# Patient Record
Sex: FEMALE | Race: WHITE | NOT HISPANIC OR LATINO
[De-identification: names, ages, dates, MRNs, and addresses within clinical notes are randomized per-mention and may not be internally consistent; named-entity substitution may affect disease eponyms.]

---

## 2018-11-11 PROBLEM — Z00.00 ENCOUNTER FOR PREVENTIVE HEALTH EXAMINATION: Status: ACTIVE | Noted: 2018-11-11

## 2018-11-14 ENCOUNTER — RECORD ABSTRACTING (OUTPATIENT)
Age: 48
End: 2018-11-14

## 2018-11-14 DIAGNOSIS — Z87.09 PERSONAL HISTORY OF OTHER DISEASES OF THE RESPIRATORY SYSTEM: ICD-10-CM

## 2018-11-14 DIAGNOSIS — F17.200 NICOTINE DEPENDENCE, UNSPECIFIED, UNCOMPLICATED: ICD-10-CM

## 2018-11-14 DIAGNOSIS — Z87.19 PERSONAL HISTORY OF OTHER DISEASES OF THE DIGESTIVE SYSTEM: ICD-10-CM

## 2018-11-14 RX ORDER — NITROGLYCERIN 0.4 MG/1
0.4 TABLET SUBLINGUAL
Refills: 0 | Status: ACTIVE | COMMUNITY

## 2018-11-14 RX ORDER — METFORMIN HYDROCHLORIDE 500 MG/1
500 TABLET, COATED ORAL
Refills: 0 | Status: ACTIVE | COMMUNITY

## 2018-11-14 RX ORDER — IPRATROPIUM BROMIDE AND ALBUTEROL SULFATE 2.5; .5 MG/3ML; MG/3ML
0.5-2.5 (3) SOLUTION RESPIRATORY (INHALATION)
Refills: 0 | Status: ACTIVE | COMMUNITY

## 2018-11-14 RX ORDER — FLUTICASONE PROPIONATE AND SALMETEROL 50; 250 UG/1; UG/1
250-50 POWDER RESPIRATORY (INHALATION)
Refills: 0 | Status: ACTIVE | COMMUNITY

## 2018-11-14 RX ORDER — ALBUTEROL SULFATE 90 UG/1
108 (90 BASE) AEROSOL, METERED RESPIRATORY (INHALATION)
Refills: 0 | Status: ACTIVE | COMMUNITY

## 2018-12-06 ENCOUNTER — RX RENEWAL (OUTPATIENT)
Age: 48
End: 2018-12-06

## 2018-12-10 ENCOUNTER — APPOINTMENT (OUTPATIENT)
Dept: RHEUMATOLOGY | Facility: CLINIC | Age: 48
End: 2018-12-10
Payer: MEDICAID

## 2018-12-10 VITALS
DIASTOLIC BLOOD PRESSURE: 70 MMHG | WEIGHT: 293 LBS | BODY MASS INDEX: 45.99 KG/M2 | HEIGHT: 67 IN | SYSTOLIC BLOOD PRESSURE: 120 MMHG

## 2018-12-10 DIAGNOSIS — E66.01 MORBID (SEVERE) OBESITY DUE TO EXCESS CALORIES: ICD-10-CM

## 2018-12-10 PROCEDURE — 96372 THER/PROPH/DIAG INJ SC/IM: CPT

## 2018-12-10 PROCEDURE — 90471 IMMUNIZATION ADMIN: CPT

## 2018-12-10 PROCEDURE — 99213 OFFICE O/P EST LOW 20 MIN: CPT | Mod: 25

## 2018-12-10 PROCEDURE — 90686 IIV4 VACC NO PRSV 0.5 ML IM: CPT

## 2018-12-10 RX ORDER — KETOROLAC TROMETHAMINE 30 MG/ML
30 INJECTION, SOLUTION INTRAMUSCULAR; INTRAVENOUS
Qty: 1 | Refills: 0 | Status: COMPLETED | OUTPATIENT
Start: 2018-12-10

## 2018-12-10 RX ORDER — METHYLPRED ACET/NACL,ISO-OS/PF 40 MG/ML
40 VIAL (ML) INJECTION
Qty: 1 | Refills: 0 | Status: COMPLETED | OUTPATIENT
Start: 2018-12-10

## 2018-12-10 RX ADMIN — METHYLPREDNISOLONE ACETATE 1 MG/ML: 40 INJECTION, SUSPENSION INTRA-ARTICULAR; INTRALESIONAL; INTRAMUSCULAR; SOFT TISSUE at 00:00

## 2018-12-10 RX ADMIN — KETOROLAC TROMETHAMINE 1 MG/ML: 30 INJECTION, SOLUTION INTRAMUSCULAR; INTRAVENOUS at 00:00

## 2019-01-07 ENCOUNTER — RX RENEWAL (OUTPATIENT)
Age: 49
End: 2019-01-07

## 2019-01-11 ENCOUNTER — RX RENEWAL (OUTPATIENT)
Age: 49
End: 2019-01-11

## 2019-02-08 ENCOUNTER — RX RENEWAL (OUTPATIENT)
Age: 49
End: 2019-02-08

## 2019-02-11 ENCOUNTER — APPOINTMENT (OUTPATIENT)
Dept: RHEUMATOLOGY | Facility: CLINIC | Age: 49
End: 2019-02-11

## 2019-02-18 ENCOUNTER — RX RENEWAL (OUTPATIENT)
Age: 49
End: 2019-02-18

## 2019-03-08 ENCOUNTER — RX RENEWAL (OUTPATIENT)
Age: 49
End: 2019-03-08

## 2019-03-18 ENCOUNTER — RX RENEWAL (OUTPATIENT)
Age: 49
End: 2019-03-18

## 2019-03-23 PROBLEM — E66.01 MORBID OBESITY: Status: ACTIVE | Noted: 2018-11-14

## 2019-03-26 ENCOUNTER — RX RENEWAL (OUTPATIENT)
Age: 49
End: 2019-03-26

## 2019-04-05 ENCOUNTER — RX RENEWAL (OUTPATIENT)
Age: 49
End: 2019-04-05

## 2019-05-01 ENCOUNTER — RX RENEWAL (OUTPATIENT)
Age: 49
End: 2019-05-01

## 2019-05-06 ENCOUNTER — RX RENEWAL (OUTPATIENT)
Age: 49
End: 2019-05-06

## 2019-05-20 ENCOUNTER — RX RENEWAL (OUTPATIENT)
Age: 49
End: 2019-05-20

## 2019-06-03 ENCOUNTER — RX RENEWAL (OUTPATIENT)
Age: 49
End: 2019-06-03

## 2019-06-23 ENCOUNTER — RX RENEWAL (OUTPATIENT)
Age: 49
End: 2019-06-23

## 2019-07-02 ENCOUNTER — APPOINTMENT (OUTPATIENT)
Dept: RHEUMATOLOGY | Facility: CLINIC | Age: 49
End: 2019-07-02
Payer: MEDICAID

## 2019-07-02 VITALS
SYSTOLIC BLOOD PRESSURE: 122 MMHG | HEIGHT: 67 IN | WEIGHT: 293 LBS | DIASTOLIC BLOOD PRESSURE: 80 MMHG | BODY MASS INDEX: 45.99 KG/M2

## 2019-07-02 DIAGNOSIS — M25.50 PAIN IN UNSPECIFIED JOINT: ICD-10-CM

## 2019-07-02 PROCEDURE — 99214 OFFICE O/P EST MOD 30 MIN: CPT | Mod: 25

## 2019-07-02 PROCEDURE — 96372 THER/PROPH/DIAG INJ SC/IM: CPT | Mod: LT

## 2019-07-02 RX ORDER — METHYLPRED ACET/NACL,ISO-OS/PF 80 MG/ML
80 VIAL (ML) INJECTION
Qty: 1 | Refills: 0 | Status: COMPLETED | OUTPATIENT
Start: 2019-07-02

## 2019-07-02 RX ORDER — KETOROLAC TROMETHAMINE 30 MG/ML
30 INJECTION, SOLUTION INTRAMUSCULAR; INTRAVENOUS
Qty: 1 | Refills: 0 | Status: COMPLETED | OUTPATIENT
Start: 2019-07-02

## 2019-07-02 RX ADMIN — METHYLPREDNISOLONE ACETATE 0 MG/ML: 80 INJECTION, SUSPENSION INTRA-ARTICULAR; INTRALESIONAL; INTRAMUSCULAR; SOFT TISSUE at 00:00

## 2019-07-02 RX ADMIN — KETOROLAC TROMETHAMINE 2 MG/ML: 30 INJECTION, SOLUTION INTRAMUSCULAR; INTRAVENOUS at 00:00

## 2019-07-02 NOTE — HISTORY OF PRESENT ILLNESS
[FreeTextEntry1] : Last night she had severe fibromyalgia pain.  She is taking Lyrica pretty much regularly.\par She cuts the Oxycodone in quarters and takes it at night if she is in severe pain.\par She is cutting back to 4 cigs a day.  She has had repeated episodes of bronchitis and in the past month she needed 2 separate courses of steroids + antibiotics.\par She feels like she does not have stamina to even walk to the door.  + fatigue\par Her right ankle is swollen.

## 2019-07-02 NOTE — PROCEDURE
[FreeTextEntry1] : Area cleaned with alcohol. Depomedrol 80 mg and Toradol 60 mg injected-IM-L gluteus.Pt tolerated procedure well.

## 2019-07-03 ENCOUNTER — RX RENEWAL (OUTPATIENT)
Age: 49
End: 2019-07-03

## 2019-08-01 ENCOUNTER — RX RENEWAL (OUTPATIENT)
Age: 49
End: 2019-08-01

## 2019-08-08 ENCOUNTER — RX RENEWAL (OUTPATIENT)
Age: 49
End: 2019-08-08

## 2019-08-31 ENCOUNTER — RX RENEWAL (OUTPATIENT)
Age: 49
End: 2019-08-31

## 2019-09-03 ENCOUNTER — APPOINTMENT (OUTPATIENT)
Dept: RHEUMATOLOGY | Facility: CLINIC | Age: 49
End: 2019-09-03

## 2019-09-06 ENCOUNTER — RX RENEWAL (OUTPATIENT)
Age: 49
End: 2019-09-06

## 2019-10-03 ENCOUNTER — RX RENEWAL (OUTPATIENT)
Age: 49
End: 2019-10-03

## 2019-10-04 ENCOUNTER — APPOINTMENT (OUTPATIENT)
Dept: RHEUMATOLOGY | Facility: CLINIC | Age: 49
End: 2019-10-04
Payer: MEDICAID

## 2019-10-04 VITALS
HEIGHT: 67 IN | SYSTOLIC BLOOD PRESSURE: 110 MMHG | WEIGHT: 293 LBS | DIASTOLIC BLOOD PRESSURE: 70 MMHG | BODY MASS INDEX: 45.99 KG/M2

## 2019-10-04 DIAGNOSIS — M25.562 PAIN IN LEFT KNEE: ICD-10-CM

## 2019-10-04 DIAGNOSIS — M54.5 LOW BACK PAIN: ICD-10-CM

## 2019-10-04 PROCEDURE — 99214 OFFICE O/P EST MOD 30 MIN: CPT

## 2019-10-04 RX ORDER — CLARITHROMYCIN 500 MG/1
500 TABLET, FILM COATED ORAL
Qty: 20 | Refills: 0 | Status: ACTIVE | COMMUNITY
Start: 2019-05-06

## 2019-10-04 RX ORDER — FLUTICASONE PROPIONATE 50 UG/1
50 SPRAY, METERED NASAL
Qty: 16 | Refills: 0 | Status: ACTIVE | COMMUNITY
Start: 2019-05-06

## 2019-10-04 RX ORDER — FLUTICASONE FUROATE AND VILANTEROL TRIFENATATE 200; 25 UG/1; UG/1
200-25 POWDER RESPIRATORY (INHALATION)
Qty: 60 | Refills: 0 | Status: ACTIVE | COMMUNITY
Start: 2019-07-01

## 2019-10-04 RX ORDER — BLOOD-GLUCOSE METER
KIT MISCELLANEOUS
Qty: 1 | Refills: 0 | Status: ACTIVE | COMMUNITY
Start: 2019-09-17

## 2019-10-04 RX ORDER — LORATADINE 10 MG/1
10 TABLET ORAL
Qty: 30 | Refills: 0 | Status: ACTIVE | COMMUNITY
Start: 2018-10-03

## 2019-10-04 RX ORDER — NICOTINE POLACRILEX 4 MG/1
4 LOZENGE ORAL
Qty: 72 | Refills: 0 | Status: ACTIVE | COMMUNITY
Start: 2019-05-31

## 2019-10-04 RX ORDER — MULTIVITAMIN WITH IRON
TABLET ORAL
Qty: 30 | Refills: 0 | Status: ACTIVE | COMMUNITY
Start: 2018-10-03

## 2019-10-04 RX ORDER — OMEPRAZOLE 40 MG/1
40 CAPSULE, DELAYED RELEASE ORAL
Qty: 30 | Refills: 0 | Status: ACTIVE | COMMUNITY
Start: 2018-10-19

## 2019-10-04 RX ORDER — PREGABALIN 100 MG/1
100 CAPSULE ORAL
Qty: 120 | Refills: 0 | Status: DISCONTINUED | COMMUNITY
Start: 2019-02-18 | End: 2019-10-04

## 2019-10-04 RX ORDER — CLINDAMYCIN PHOSPHATE 10 MG/ML
1 SOLUTION TOPICAL
Qty: 60 | Refills: 0 | Status: ACTIVE | COMMUNITY
Start: 2019-09-12

## 2019-10-04 RX ORDER — ATORVASTATIN CALCIUM 20 MG/1
20 TABLET, FILM COATED ORAL
Qty: 30 | Refills: 0 | Status: ACTIVE | COMMUNITY
Start: 2019-09-15

## 2019-10-04 RX ORDER — BENZONATATE 100 MG/1
100 CAPSULE ORAL
Qty: 21 | Refills: 0 | Status: ACTIVE | COMMUNITY
Start: 2019-05-06

## 2019-10-04 RX ORDER — IPRATROPIUM BROMIDE 0.5 MG/2.5ML
0.02 SOLUTION RESPIRATORY (INHALATION)
Qty: 125 | Refills: 0 | Status: ACTIVE | COMMUNITY
Start: 2019-08-01

## 2019-10-04 RX ORDER — PREDNISONE 10 MG/1
10 TABLET ORAL
Qty: 19 | Refills: 0 | Status: ACTIVE | COMMUNITY
Start: 2019-05-16

## 2019-10-04 RX ORDER — LANCETS 28 GAUGE
EACH MISCELLANEOUS
Qty: 100 | Refills: 0 | Status: ACTIVE | COMMUNITY
Start: 2019-09-17

## 2019-10-04 RX ORDER — MONTELUKAST SODIUM 10 MG/1
10 TABLET, FILM COATED ORAL
Refills: 0 | Status: DISCONTINUED | COMMUNITY
End: 2019-10-04

## 2019-10-04 RX ORDER — BLOOD SUGAR DIAGNOSTIC
STRIP MISCELLANEOUS
Qty: 100 | Refills: 0 | Status: ACTIVE | COMMUNITY
Start: 2019-09-17

## 2019-10-06 PROBLEM — M25.562 ACUTE PAIN OF LEFT KNEE: Status: ACTIVE | Noted: 2019-10-04

## 2019-10-06 NOTE — REVIEW OF SYSTEMS
[Feeling Poorly] : feeling poorly [Feeling Tired] : feeling tired [Recent Weight Loss (___ Lbs)] : recent [unfilled] ~Ulb weight loss [SOB on Exertion] : shortness of breath during exertion [Dry Eyes] : dryness of the eyes [Joint Pain] : joint pain [Skin Wound] : skin wound [Joint Stiffness] : joint stiffness [Anxiety] : anxiety [Sleep Disturbances] : sleep disturbances [Negative] : Heme/Lymph

## 2019-10-06 NOTE — HISTORY OF PRESENT ILLNESS
[FreeTextEntry1] : She was put on Metformin and is having sweats when her sugar increases.\par She has pain in her right lower back, throbbing in quality, mostly at night.\par Her knees are also hurting.\par She has been mostly compliant with taking the Lyrica.

## 2019-11-05 ENCOUNTER — RX RENEWAL (OUTPATIENT)
Age: 49
End: 2019-11-05

## 2019-12-03 ENCOUNTER — RX RENEWAL (OUTPATIENT)
Age: 49
End: 2019-12-03

## 2019-12-12 ENCOUNTER — APPOINTMENT (OUTPATIENT)
Dept: RHEUMATOLOGY | Facility: CLINIC | Age: 49
End: 2019-12-12

## 2020-01-02 ENCOUNTER — RX RENEWAL (OUTPATIENT)
Age: 50
End: 2020-01-02

## 2020-01-07 ENCOUNTER — RX RENEWAL (OUTPATIENT)
Age: 50
End: 2020-01-07

## 2020-01-09 ENCOUNTER — APPOINTMENT (OUTPATIENT)
Dept: RHEUMATOLOGY | Facility: CLINIC | Age: 50
End: 2020-01-09
Payer: MEDICAID

## 2020-01-09 VITALS
DIASTOLIC BLOOD PRESSURE: 80 MMHG | WEIGHT: 293 LBS | SYSTOLIC BLOOD PRESSURE: 120 MMHG | BODY MASS INDEX: 45.99 KG/M2 | HEIGHT: 67 IN

## 2020-01-09 DIAGNOSIS — Z23 ENCOUNTER FOR IMMUNIZATION: ICD-10-CM

## 2020-01-09 PROCEDURE — 90686 IIV4 VACC NO PRSV 0.5 ML IM: CPT

## 2020-01-09 PROCEDURE — G0008: CPT

## 2020-01-09 PROCEDURE — 99214 OFFICE O/P EST MOD 30 MIN: CPT | Mod: 25

## 2020-01-09 RX ORDER — KETOROLAC TROMETHAMINE 60 MG/2ML
60 INJECTION, SOLUTION INTRAMUSCULAR
Qty: 1 | Refills: 0 | Status: COMPLETED | OUTPATIENT
Start: 2020-01-09

## 2020-01-09 RX ORDER — METHYLPRED ACET/NACL,ISO-OS/PF 80 MG/ML
80 VIAL (ML) INJECTION
Qty: 1 | Refills: 0 | Status: COMPLETED | OUTPATIENT
Start: 2020-01-09

## 2020-01-09 RX ADMIN — METHYLPREDNISOLONE ACETATE 0 MG/ML: 80 INJECTION, SUSPENSION INTRA-ARTICULAR; INTRALESIONAL; INTRAMUSCULAR; SOFT TISSUE at 00:00

## 2020-01-09 RX ADMIN — KETOROLAC TROMETHAMINE 2 MG/2ML: 30 INJECTION, SOLUTION INTRAMUSCULAR at 00:00

## 2020-01-16 NOTE — REVIEW OF SYSTEMS
[Feeling Poorly] : feeling poorly [Feeling Tired] : feeling tired [Recent Weight Loss (___ Lbs)] : recent [unfilled] ~Ulb weight loss [Dry Eyes] : dryness of the eyes [SOB on Exertion] : shortness of breath during exertion [Joint Pain] : joint pain [Joint Stiffness] : joint stiffness [Skin Wound] : skin wound [Sleep Disturbances] : sleep disturbances [Anxiety] : anxiety [Negative] : Heme/Lymph

## 2020-01-16 NOTE — HISTORY OF PRESENT ILLNESS
[FreeTextEntry1] : She was sick with bronchitis twice,  requiring levaquin and prednisone.\par She was feeling very well until it started to rain.  Now she has pain everywhere: her back, knees, hands, ankles.\par She has not been compliant with taking Lyrica.  She thought it was causing LE edema.\par She feels very unsteady walking, like her knee is going to buckle.

## 2020-01-16 NOTE — PROCEDURE
[FreeTextEntry1] : Intramuscular injection:\par \par Area cleaned with alcohol.Depomedrol 80mg and Toradol 60mg injected IM- L gluteus, Fluzone 0.5mg injected -Im -L deltoid..Pt tolerated procedure well.\par

## 2020-03-05 ENCOUNTER — APPOINTMENT (OUTPATIENT)
Dept: RHEUMATOLOGY | Facility: CLINIC | Age: 50
End: 2020-03-05

## 2020-05-08 ENCOUNTER — APPOINTMENT (OUTPATIENT)
Dept: RHEUMATOLOGY | Facility: CLINIC | Age: 50
End: 2020-05-08

## 2020-05-26 ENCOUNTER — RX RENEWAL (OUTPATIENT)
Age: 50
End: 2020-05-26

## 2020-05-26 ENCOUNTER — APPOINTMENT (OUTPATIENT)
Dept: RHEUMATOLOGY | Facility: CLINIC | Age: 50
End: 2020-05-26
Payer: MEDICAID

## 2020-05-26 PROCEDURE — 99214 OFFICE O/P EST MOD 30 MIN: CPT | Mod: 95

## 2020-05-27 NOTE — ASSESSMENT
[FreeTextEntry1] : we discussed compliance with Lyrica\par to look up quadriceps strengthening exercises\par

## 2020-05-27 NOTE — HISTORY OF PRESENT ILLNESS
[Home] : at home, [unfilled] , at the time of the visit. [Medical Office: ([facility])___] : at the medical office located in  [Verbal consent obtained from patient] : the patient, [unfilled] [FreeTextEntry1] : Her legs feel weak.  She cant stand for more than 3 minutes without feeling like her legs are going to give out.  Pain in her legs wake her up.  When it rains she has pain in a week.\par She has not been taking Lyrica regularly.\par The shot she received worked until a week ago.

## 2020-07-06 ENCOUNTER — RX RENEWAL (OUTPATIENT)
Age: 50
End: 2020-07-06

## 2020-07-23 ENCOUNTER — APPOINTMENT (OUTPATIENT)
Dept: RHEUMATOLOGY | Facility: CLINIC | Age: 50
End: 2020-07-23
Payer: MEDICAID

## 2020-07-23 DIAGNOSIS — M79.10 MYALGIA, UNSPECIFIED SITE: ICD-10-CM

## 2020-07-23 DIAGNOSIS — M77.51 OTHER ENTHESOPATHY OF RT FOOT AND ANKLE: ICD-10-CM

## 2020-07-23 DIAGNOSIS — M75.02 ADHESIVE CAPSULITIS OF LEFT SHOULDER: ICD-10-CM

## 2020-07-23 PROCEDURE — 99214 OFFICE O/P EST MOD 30 MIN: CPT | Mod: 95

## 2020-07-25 NOTE — HISTORY OF PRESENT ILLNESS
[Home] : at home, [unfilled] , at the time of the visit. [Medical Office: ([facility])___] : at the medical office located in  [Verbal consent obtained from patient] : the patient, [unfilled] [FreeTextEntry1] : Both her sons developed a high fever.  She went to get tested and both kids got tested.  She tested negative but they didn't go all the way up.  She had a sore throat and a headache, but feels better now.\par When it was raining her knees buckled when she first gets out of bed and moves.  Now her hips feel stuck.  Her mobility has been down.  She has been at home since March 9 and only leaves the house to go to the market.\par She has pain in her chest at the FM tender points.\par She started taking Cyclobenzaprine at night and feels much better. She does not need to take Motrin any more.

## 2020-09-25 ENCOUNTER — APPOINTMENT (OUTPATIENT)
Dept: RHEUMATOLOGY | Facility: CLINIC | Age: 50
End: 2020-09-25
Payer: MEDICAID

## 2020-09-25 PROCEDURE — 99442: CPT

## 2020-09-28 NOTE — HISTORY OF PRESENT ILLNESS
[Home] : at home, [unfilled] , at the time of the visit. [Medical Office: ([facility])___] : at the medical office located in  [Verbal consent obtained from patient] : the patient, [unfilled] [FreeTextEntry1] : She has low back pain and left knee pain.  She hears crackling in her left knee.  She fell last thursday bc her knee collapsed.\par She has not left her house for the past 3 weeks due to COVID.\par She is not compliant with taking Lyrica.

## 2020-09-29 ENCOUNTER — RX RENEWAL (OUTPATIENT)
Age: 50
End: 2020-09-29

## 2021-04-05 ENCOUNTER — TRANSCRIPTION ENCOUNTER (OUTPATIENT)
Age: 51
End: 2021-04-05

## 2021-04-05 ENCOUNTER — APPOINTMENT (OUTPATIENT)
Dept: RHEUMATOLOGY | Facility: CLINIC | Age: 51
End: 2021-04-05
Payer: MEDICAID

## 2021-04-05 DIAGNOSIS — R06.02 SHORTNESS OF BREATH: ICD-10-CM

## 2021-04-05 PROCEDURE — 99214 OFFICE O/P EST MOD 30 MIN: CPT | Mod: 95

## 2021-04-13 ENCOUNTER — TRANSCRIPTION ENCOUNTER (OUTPATIENT)
Age: 51
End: 2021-04-13

## 2021-04-13 PROBLEM — R06.02 SHORTNESS OF BREATH ON EXERTION: Status: ACTIVE | Noted: 2021-04-13

## 2021-04-13 NOTE — ASSESSMENT
[FreeTextEntry1] : will look into prior auth for Lyrica\par needs cardiologist eval for SOB on exertion\par can come in for IM injection for FM flare if needs\par neurosurgery eval for progression of neurologic issues (Dr. Bravo)

## 2021-04-13 NOTE — HISTORY OF PRESENT ILLNESS
[Home] : at home, [unfilled] , at the time of the visit. [Medical Office: ([facility])___] : at the medical office located in  [Verbal consent obtained from patient] : the patient, [unfilled] [FreeTextEntry1] : Since yesterday she has more pain.  She is more fatigued than usual and is sleeping more.\par She notes that she feels SOB with minimal exertion and talking.  She saw her PMD in November for a physical and her EKG was normal.\par She is off her Lyrica for several weeks bc of insurance issues.\par Her cane broke and so she has been holding on to furniture in her apartment.  She fell the other day and bruised her right knee.\par Sometimes her knee salas.\par She is having trouble lifting her leg to get into the shower.\par She is having brain fog and memory issues.\par Two toes are numb but now she thinks her whole foot is numb and even the back of her knee.

## 2021-04-27 ENCOUNTER — TRANSCRIPTION ENCOUNTER (OUTPATIENT)
Age: 51
End: 2021-04-27

## 2021-06-14 ENCOUNTER — TRANSCRIPTION ENCOUNTER (OUTPATIENT)
Age: 51
End: 2021-06-14

## 2021-07-06 ENCOUNTER — TRANSCRIPTION ENCOUNTER (OUTPATIENT)
Age: 51
End: 2021-07-06

## 2021-07-13 ENCOUNTER — TRANSCRIPTION ENCOUNTER (OUTPATIENT)
Age: 51
End: 2021-07-13

## 2021-07-28 ENCOUNTER — APPOINTMENT (OUTPATIENT)
Dept: RHEUMATOLOGY | Facility: CLINIC | Age: 51
End: 2021-07-28
Payer: MEDICAID

## 2021-07-28 PROCEDURE — 99213 OFFICE O/P EST LOW 20 MIN: CPT | Mod: 95

## 2021-08-02 NOTE — ASSESSMENT
[FreeTextEntry1] : Xray of knees show degen arthritis.  MRI of knee was approved, but it . Will extend auth for MRI knee.

## 2021-08-02 NOTE — HISTORY OF PRESENT ILLNESS
[Home] : at home, [unfilled] , at the time of the visit. [Medical Office: ([facility])___] : at the medical office located in  [Verbal consent obtained from patient] : the patient, [unfilled] [FreeTextEntry1] : She was just admitted to [facility] with bacterial colitis.  Her heart rate dipped with Ketorolac.\par They treated her with Cipro and Flagyl.  She went home and developed a rash over 90% of her body.  She went to [facility] and discontinued Cipro (she has taken Flagyl before).\par Her knee pain is bad.  Her mobility is decreasing.  She cant stand for more than a minute.\par She was supposed to come in for steroid injection but cant come in bc she is still having diarrhea.\par She cant take Motrin bc of the stomach pains and diarrhea.\par

## 2021-08-12 ENCOUNTER — TRANSCRIPTION ENCOUNTER (OUTPATIENT)
Age: 51
End: 2021-08-12

## 2021-08-13 ENCOUNTER — TRANSCRIPTION ENCOUNTER (OUTPATIENT)
Age: 51
End: 2021-08-13

## 2021-08-24 ENCOUNTER — TRANSCRIPTION ENCOUNTER (OUTPATIENT)
Age: 51
End: 2021-08-24

## 2021-09-14 ENCOUNTER — TRANSCRIPTION ENCOUNTER (OUTPATIENT)
Age: 51
End: 2021-09-14

## 2021-10-14 ENCOUNTER — TRANSCRIPTION ENCOUNTER (OUTPATIENT)
Age: 51
End: 2021-10-14

## 2021-10-18 ENCOUNTER — TRANSCRIPTION ENCOUNTER (OUTPATIENT)
Age: 51
End: 2021-10-18

## 2021-10-28 ENCOUNTER — TRANSCRIPTION ENCOUNTER (OUTPATIENT)
Age: 51
End: 2021-10-28

## 2021-10-29 ENCOUNTER — TRANSCRIPTION ENCOUNTER (OUTPATIENT)
Age: 51
End: 2021-10-29

## 2021-11-04 ENCOUNTER — TRANSCRIPTION ENCOUNTER (OUTPATIENT)
Age: 51
End: 2021-11-04

## 2022-01-04 ENCOUNTER — TRANSCRIPTION ENCOUNTER (OUTPATIENT)
Age: 52
End: 2022-01-04

## 2022-01-08 RX ORDER — ACETAMINOPHEN AND CODEINE PHOSPHATE 300; 60 MG/1; MG/1
300-60 TABLET ORAL
Qty: 120 | Refills: 0 | Status: ACTIVE | COMMUNITY
Start: 1900-01-01 | End: 1900-01-01

## 2022-03-01 ENCOUNTER — TRANSCRIPTION ENCOUNTER (OUTPATIENT)
Age: 52
End: 2022-03-01

## 2022-04-19 ENCOUNTER — TRANSCRIPTION ENCOUNTER (OUTPATIENT)
Age: 52
End: 2022-04-19

## 2022-04-25 ENCOUNTER — APPOINTMENT (OUTPATIENT)
Dept: RHEUMATOLOGY | Facility: CLINIC | Age: 52
End: 2022-04-25
Payer: MEDICAID

## 2022-04-25 PROCEDURE — 99214 OFFICE O/P EST MOD 30 MIN: CPT | Mod: 95

## 2022-04-28 RX ORDER — DICLOFENAC SODIUM 10 MG/G
1 GEL TOPICAL
Qty: 3 | Refills: 3 | Status: ACTIVE | COMMUNITY
Start: 2019-10-04

## 2022-04-28 NOTE — HISTORY OF PRESENT ILLNESS
[Home] : at home, [unfilled] , at the time of the visit. [Medical Office: ([facility])___] : at the medical office located in  [Verbal consent obtained from patient] : the patient, [unfilled] [FreeTextEntry1] : Her  passed away unexpectedly on 12//7/21.  She is very depressed and isnt sleeping well at night.\par She lost 40 pounds bc she only eats if she is starving.\par She fell.  WHen she sits on her butt cheeks the pain travels down the back of her legs.  \par Her hernia started to bleed again.\par Her left knee hurts a lot.\par \par She has not been taking Lyrica in months.  She doesn't use the Tylenol as much.\par \par She had COVID a few weeks ago.

## 2022-06-15 ENCOUNTER — APPOINTMENT (OUTPATIENT)
Dept: RHEUMATOLOGY | Facility: CLINIC | Age: 52
End: 2022-06-15
Payer: MEDICAID

## 2022-06-15 DIAGNOSIS — F32.A DEPRESSION, UNSPECIFIED: ICD-10-CM

## 2022-06-15 DIAGNOSIS — M25.562 PAIN IN LEFT KNEE: ICD-10-CM

## 2022-06-15 PROCEDURE — 99213 OFFICE O/P EST LOW 20 MIN: CPT | Mod: 95

## 2022-06-16 ENCOUNTER — TRANSCRIPTION ENCOUNTER (OUTPATIENT)
Age: 52
End: 2022-06-16

## 2022-06-21 PROBLEM — F32.A DEPRESSION: Status: ACTIVE | Noted: 2022-06-21

## 2022-06-21 PROBLEM — M25.562 LEFT KNEE PAIN: Status: RESOLVED | Noted: 2020-09-28 | Resolved: 2022-06-21

## 2022-06-21 RX ORDER — CYCLOBENZAPRINE HYDROCHLORIDE 10 MG/1
10 TABLET, FILM COATED ORAL
Qty: 30 | Refills: 3 | Status: ACTIVE | COMMUNITY
Start: 2020-05-26

## 2022-06-21 NOTE — ASSESSMENT
[FreeTextEntry1] : restart Lyrica\par \par needs Rx to say:\par [facility]\par fax to [facility] attn: Elizabeth\par fax number [phone]

## 2022-06-21 NOTE — HISTORY OF PRESENT ILLNESS
[Home] : at home, [unfilled] , at the time of the visit. [Medical Office: ([facility])___] : at the medical office located in  [Verbal consent obtained from patient] : the patient, [unfilled] [FreeTextEntry1] : She c/o left knee pain.  She has difficulty bending her knee due to pain.  She cant walk.  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  She has not taken her Lyrica.\par \par Her PMD prescribed Prozac, but she has not started it yet.\par \par

## 2022-07-19 ENCOUNTER — TRANSCRIPTION ENCOUNTER (OUTPATIENT)
Age: 52
End: 2022-07-19

## 2022-08-28 ENCOUNTER — TRANSCRIPTION ENCOUNTER (OUTPATIENT)
Age: 52
End: 2022-08-28

## 2022-09-15 ENCOUNTER — APPOINTMENT (OUTPATIENT)
Dept: RHEUMATOLOGY | Facility: CLINIC | Age: 52
End: 2022-09-15

## 2022-12-30 ENCOUNTER — TRANSCRIPTION ENCOUNTER (OUTPATIENT)
Age: 52
End: 2022-12-30

## 2023-01-09 ENCOUNTER — TRANSCRIPTION ENCOUNTER (OUTPATIENT)
Age: 53
End: 2023-01-09

## 2023-01-11 ENCOUNTER — APPOINTMENT (OUTPATIENT)
Dept: RHEUMATOLOGY | Facility: CLINIC | Age: 53
End: 2023-01-11
Payer: MEDICAID

## 2023-01-11 VITALS
BODY MASS INDEX: 51.91 KG/M2 | HEART RATE: 55 BPM | HEIGHT: 63 IN | DIASTOLIC BLOOD PRESSURE: 72 MMHG | OXYGEN SATURATION: 98 % | WEIGHT: 293 LBS | SYSTOLIC BLOOD PRESSURE: 130 MMHG

## 2023-01-11 PROCEDURE — 20610 DRAIN/INJ JOINT/BURSA W/O US: CPT | Mod: LT

## 2023-01-11 PROCEDURE — 99215 OFFICE O/P EST HI 40 MIN: CPT | Mod: 25

## 2023-01-11 RX ORDER — METHYLPRED ACET/NACL,ISO-OS/PF 80 MG/ML
80 VIAL (ML) INJECTION
Qty: 1 | Refills: 0 | Status: COMPLETED | OUTPATIENT
Start: 2023-01-11

## 2023-01-11 RX ADMIN — METHYLPREDNISOLONE ACETATE 1 MG/ML: 80 INJECTION, SUSPENSION INTRA-ARTICULAR; INTRALESIONAL; INTRAMUSCULAR; SOFT TISSUE at 00:00

## 2023-02-01 RX ORDER — ERGOCALCIFEROL 1.25 MG/1
1.25 MG CAPSULE, LIQUID FILLED ORAL
Qty: 4 | Refills: 0 | Status: ACTIVE | COMMUNITY
Start: 2018-10-23

## 2023-02-01 RX ORDER — PREGABALIN 100 MG/1
100 CAPSULE ORAL
Qty: 120 | Refills: 0 | Status: ACTIVE | COMMUNITY
Start: 2019-08-08

## 2023-02-01 RX ORDER — DICLOFENAC SODIUM 1% 10 MG/G
1 GEL TOPICAL
Qty: 3 | Refills: 3 | Status: ACTIVE | COMMUNITY
Start: 2021-02-24

## 2023-02-01 RX ORDER — ACETAMINOPHEN AND CODEINE 300; 30 MG/1; MG/1
300-30 TABLET ORAL
Qty: 120 | Refills: 0 | Status: ACTIVE | COMMUNITY
Start: 2021-03-15

## 2023-02-01 NOTE — PROCEDURE
[FreeTextEntry1] : Left knee prepped and draped in usual sterile fashion. 10 ml of 1% Lidocaine used for anesthesia.  L knee arthrocentesis performed with aspiration of 0 cc synovial fluid.  Depomedrol 80 mg and 1 cc of 1% Lidocaine injected into left knee. Patient tolerated procedure well without complications. Post-procedure instructions given.\par

## 2023-02-01 NOTE — HISTORY OF PRESENT ILLNESS
[FreeTextEntry1] : c/o left knee pain.  Previous steroid injection lasted 3 years.  Had injection by  ortho via lateral approach by ortho, which gave 3 days of relief.\par She has difficulty standing/walking due to knee pain.

## 2023-04-20 ENCOUNTER — APPOINTMENT (OUTPATIENT)
Dept: RHEUMATOLOGY | Facility: CLINIC | Age: 53
End: 2023-04-20
Payer: MEDICAID

## 2023-04-20 VITALS
BODY MASS INDEX: 51.91 KG/M2 | HEIGHT: 63 IN | HEART RATE: 52 BPM | WEIGHT: 293 LBS | SYSTOLIC BLOOD PRESSURE: 130 MMHG | OXYGEN SATURATION: 96 % | DIASTOLIC BLOOD PRESSURE: 72 MMHG

## 2023-04-20 DIAGNOSIS — M47.896 OTHER SPONDYLOSIS, LUMBAR REGION: ICD-10-CM

## 2023-04-20 DIAGNOSIS — M79.7 FIBROMYALGIA: ICD-10-CM

## 2023-04-20 DIAGNOSIS — M17.0 BILATERAL PRIMARY OSTEOARTHRITIS OF KNEE: ICD-10-CM

## 2023-04-20 DIAGNOSIS — M47.812 SPONDYLOSIS W/OUT MYELOPATHY OR RADICULOPATHY, CERVICAL REGION: ICD-10-CM

## 2023-04-20 DIAGNOSIS — M47.14 OTHER SPONDYLOSIS WITH MYELOPATHY, THORACIC REGION: ICD-10-CM

## 2023-04-20 DIAGNOSIS — M17.11 UNILATERAL PRIMARY OSTEOARTHRITIS, RIGHT KNEE: ICD-10-CM

## 2023-04-20 DIAGNOSIS — M25.562 PAIN IN LEFT KNEE: ICD-10-CM

## 2023-04-20 DIAGNOSIS — G89.29 OTHER CHRONIC PAIN: ICD-10-CM

## 2023-04-20 PROCEDURE — 99215 OFFICE O/P EST HI 40 MIN: CPT | Mod: 25

## 2023-04-20 PROCEDURE — 20610 DRAIN/INJ JOINT/BURSA W/O US: CPT | Mod: LT

## 2023-04-29 PROBLEM — M79.7 FIBROMYALGIA: Status: ACTIVE | Noted: 2018-11-14

## 2023-04-29 PROBLEM — M47.14 OSTEOARTHRITIS OF THORACIC SPINE WITH MYELOPATHY: Status: ACTIVE | Noted: 2019-03-23

## 2023-04-29 PROBLEM — M25.562 LEFT KNEE PAIN: Status: ACTIVE | Noted: 2022-06-21

## 2023-04-29 PROBLEM — G89.29 CHRONIC PAIN: Status: ACTIVE | Noted: 2019-01-11

## 2023-04-29 PROBLEM — M47.812 SPONDYLOSIS OF CERVICAL JOINT: Status: ACTIVE | Noted: 2018-11-14

## 2023-04-29 PROBLEM — M17.0 OSTEOARTHRITIS OF KNEES, BILATERAL: Status: ACTIVE | Noted: 2019-10-04

## 2023-04-29 PROBLEM — M47.896 OTHER SPONDYLOSIS, LUMBAR REGION: Status: ACTIVE | Noted: 2018-11-14

## 2023-04-29 PROBLEM — M17.11 PRIMARY OSTEOARTHRITIS OF RIGHT KNEE: Status: ACTIVE | Noted: 2018-11-14

## 2023-04-29 RX ADMIN — Medication 1 MG/ML: at 00:00

## 2023-04-29 NOTE — HISTORY OF PRESENT ILLNESS
[FreeTextEntry1] : She received a cortisone shot in her left knee on 1/11/23, which gave 3 months of complete relief.  However, the pain in her left knee returned 1 week ago and is now waking her up at night.\par \par She has lost 40 pounds and is using less pain meds.  She only uses Motrin for pain now.  She has not taken Lyrica in months.

## 2023-05-01 RX ORDER — METHYLPRED ACET/NACL,ISO-OS/PF 80 MG/ML
80 VIAL (ML) INJECTION
Qty: 1 | Refills: 0 | Status: COMPLETED | OUTPATIENT
Start: 2023-04-29

## 2023-05-16 ENCOUNTER — APPOINTMENT (OUTPATIENT)
Dept: RHEUMATOLOGY | Facility: CLINIC | Age: 53
End: 2023-05-16

## 2023-06-06 ENCOUNTER — TRANSCRIPTION ENCOUNTER (OUTPATIENT)
Age: 53
End: 2023-06-06

## 2023-07-11 ENCOUNTER — APPOINTMENT (OUTPATIENT)
Dept: RHEUMATOLOGY | Facility: CLINIC | Age: 53
End: 2023-07-11

## 2023-07-19 ENCOUNTER — TRANSCRIPTION ENCOUNTER (OUTPATIENT)
Age: 53
End: 2023-07-19

## 2023-08-01 ENCOUNTER — TRANSCRIPTION ENCOUNTER (OUTPATIENT)
Age: 53
End: 2023-08-01

## 2023-08-01 ENCOUNTER — RX RENEWAL (OUTPATIENT)
Age: 53
End: 2023-08-01

## 2023-08-01 RX ORDER — IBUPROFEN 800 MG/1
800 TABLET, FILM COATED ORAL
Qty: 90 | Refills: 3 | Status: ACTIVE | COMMUNITY
Start: 2020-01-07 | End: 1900-01-01

## 2023-10-05 ENCOUNTER — TRANSCRIPTION ENCOUNTER (OUTPATIENT)
Age: 53
End: 2023-10-05

## 2023-10-23 RX ORDER — OXYCODONE 10 MG/1
10 TABLET ORAL
Qty: 30 | Refills: 0 | Status: ACTIVE | COMMUNITY
Start: 2019-01-11 | End: 1900-01-01

## 2023-10-25 ENCOUNTER — TRANSCRIPTION ENCOUNTER (OUTPATIENT)
Age: 53
End: 2023-10-25